# Patient Record
Sex: FEMALE | Race: WHITE
[De-identification: names, ages, dates, MRNs, and addresses within clinical notes are randomized per-mention and may not be internally consistent; named-entity substitution may affect disease eponyms.]

---

## 2019-10-24 ENCOUNTER — HOSPITAL ENCOUNTER (OUTPATIENT)
Dept: HOSPITAL 62 - RAD | Age: 34
End: 2019-10-24
Attending: ADVANCED PRACTICE MIDWIFE
Payer: OTHER GOVERNMENT

## 2019-10-24 DIAGNOSIS — O99.89: Primary | ICD-10-CM

## 2019-10-24 DIAGNOSIS — M54.5: ICD-10-CM

## 2019-10-24 DIAGNOSIS — Z3A.00: ICD-10-CM

## 2019-10-24 PROCEDURE — 76770 US EXAM ABDO BACK WALL COMP: CPT

## 2019-10-24 NOTE — RADIOLOGY REPORT (SQ)
EXAM DESCRIPTION:  U/S RETROPERITON (RENAL/AORTA)



COMPLETED DATE/TIME:  10/24/2019 4:50 pm



REASON FOR STUDY:  R SIDED CVA PAIN - PT IS PREGNANT M54.6  PAIN IN THORACIC SPINE



COMPARISON:  None.



TECHNIQUE:  Dynamic and static grayscale images acquired of the kidneys and bladder and recorded on P
ACS. Additional selected color Doppler and spectral images recorded.



LIMITATIONS:  None.



FINDINGS:  RIGHT KIDNEY:  The right kidney measures 13.0 cm in diameter, normal size.  Normal echogen
icity.  The right renal pelvis is dilated measures 2.5 cm in AP diameter.

LEFT KIDNEY:  The left kidney measures 12.1 cm in length, normal size. Normal echogenicity. No solid 
or suspicious masses. No hydronephrosis. No calcifications.

BLADDER: No masses.

OTHER FINDINGS:  The patient is pregnant.



IMPRESSION:  1.  The right renal pelvis is dilated. (the patient is pregnant.  The gestational age is
 not available at this reading).

2.  No hydronephrosis on the left.



TECHNICAL DOCUMENTATION:  JOB ID:  [id number]

 2011 [employer]- All Rights Reserved



Reading location - IP/workstation name: [email or web address]

## 2020-02-06 ENCOUNTER — HOSPITAL ENCOUNTER (OUTPATIENT)
Dept: HOSPITAL 62 - LC | Age: 35
Discharge: HOME | End: 2020-02-06
Attending: OBSTETRICS & GYNECOLOGY
Payer: OTHER GOVERNMENT

## 2020-02-06 DIAGNOSIS — Z3A.34: ICD-10-CM

## 2020-02-06 DIAGNOSIS — O47.03: ICD-10-CM

## 2020-02-06 DIAGNOSIS — O26.893: Primary | ICD-10-CM

## 2020-02-06 PROCEDURE — 59025 FETAL NON-STRESS TEST: CPT

## 2020-02-06 PROCEDURE — 4A1HXCZ MONITORING OF PRODUCTS OF CONCEPTION, CARDIAC RATE, EXTERNAL APPROACH: ICD-10-PCS | Performed by: OBSTETRICS & GYNECOLOGY

## 2020-02-28 ENCOUNTER — HOSPITAL ENCOUNTER (INPATIENT)
Dept: HOSPITAL 62 - LC | Age: 35
LOS: 3 days | Discharge: HOME | End: 2020-03-02
Attending: OBSTETRICS & GYNECOLOGY | Admitting: OBSTETRICS & GYNECOLOGY
Payer: OTHER GOVERNMENT

## 2020-02-28 DIAGNOSIS — O42.92: Primary | ICD-10-CM

## 2020-02-28 DIAGNOSIS — Z3A.37: ICD-10-CM

## 2020-02-28 DIAGNOSIS — F32.9: ICD-10-CM

## 2020-02-28 LAB
ADD MANUAL DIFF: NO
APPEARANCE UR: CLEAR
APTT PPP: (no result) S
BARBITURATES UR QL SCN: NEGATIVE
BASOPHILS # BLD AUTO: 0 10^3/UL (ref 0–0.2)
BASOPHILS NFR BLD AUTO: 0.3 % (ref 0–2)
BILIRUB UR QL STRIP: NEGATIVE
EOSINOPHIL # BLD AUTO: 0.1 10^3/UL (ref 0–0.6)
EOSINOPHIL NFR BLD AUTO: 0.6 % (ref 0–6)
ERYTHROCYTE [DISTWIDTH] IN BLOOD BY AUTOMATED COUNT: 13.3 % (ref 11.5–14)
GLUCOSE UR STRIP-MCNC: NEGATIVE MG/DL
HCT VFR BLD CALC: 33.7 % (ref 36–47)
HGB BLD-MCNC: 11.5 G/DL (ref 12–15.5)
KETONES UR STRIP-MCNC: NEGATIVE MG/DL
LYMPHOCYTES # BLD AUTO: 1.8 10^3/UL (ref 0.5–4.7)
LYMPHOCYTES NFR BLD AUTO: 13.2 % (ref 13–45)
MCH RBC QN AUTO: 29 PG (ref 27–33.4)
MCHC RBC AUTO-ENTMCNC: 34.3 G/DL (ref 32–36)
MCV RBC AUTO: 85 FL (ref 80–97)
METHADONE UR QL SCN: NEGATIVE
MONOCYTES # BLD AUTO: 1.1 10^3/UL (ref 0.1–1.4)
MONOCYTES NFR BLD AUTO: 8.5 % (ref 3–13)
NEUTROPHILS # BLD AUTO: 10.3 10^3/UL (ref 1.7–8.2)
NEUTS SEG NFR BLD AUTO: 77.4 % (ref 42–78)
NITRITE UR QL STRIP: NEGATIVE
PCP UR QL SCN: NEGATIVE
PH UR STRIP: 7 [PH] (ref 5–9)
PLATELET # BLD: 252 10^3/UL (ref 150–450)
PROT UR STRIP-MCNC: NEGATIVE MG/DL
RBC # BLD AUTO: 3.98 10^6/UL (ref 3.72–5.28)
SP GR UR STRIP: 1
TOTAL CELLS COUNTED % (AUTO): 100 %
URINE AMPHETAMINES SCREEN: NEGATIVE
URINE BENZODIAZEPINES SCREEN: NEGATIVE
URINE COCAINE SCREEN: NEGATIVE
URINE MARIJUANA (THC) SCREEN: NEGATIVE
UROBILINOGEN UR-MCNC: NEGATIVE MG/DL (ref ?–2)
WBC # BLD AUTO: 13.4 10^3/UL (ref 4–10.5)

## 2020-02-28 PROCEDURE — 86901 BLOOD TYPING SEROLOGIC RH(D): CPT

## 2020-02-28 PROCEDURE — 81005 URINALYSIS: CPT

## 2020-02-28 PROCEDURE — 86900 BLOOD TYPING SEROLOGIC ABO: CPT

## 2020-02-28 PROCEDURE — 94760 N-INVAS EAR/PLS OXIMETRY 1: CPT

## 2020-02-28 PROCEDURE — 36415 COLL VENOUS BLD VENIPUNCTURE: CPT

## 2020-02-28 PROCEDURE — 80307 DRUG TEST PRSMV CHEM ANLYZR: CPT

## 2020-02-28 PROCEDURE — 86850 RBC ANTIBODY SCREEN: CPT

## 2020-02-28 PROCEDURE — 86592 SYPHILIS TEST NON-TREP QUAL: CPT

## 2020-02-28 PROCEDURE — 85025 COMPLETE CBC W/AUTO DIFF WBC: CPT

## 2020-02-28 PROCEDURE — 85027 COMPLETE CBC AUTOMATED: CPT

## 2020-02-28 PROCEDURE — 84112 EVAL AMNIOTIC FLUID PROTEIN: CPT

## 2020-02-28 NOTE — ADMISSION PHYSICAL
=================================================================



=================================================================

Datetime Report Generated by CPN: 2020 18:10

   

   

=================================================================

CURRENT ADMISSION

=================================================================

   

Chief Complaint:  Suspected Ruptured Membranes

Chief Complaint Other:  "I think my water broke at 4:00"

Indication for Induction:  Not Applicable

Admit Impression :  Term, Intrauterine Pregnancy; Ruptured Membranes

Admit Plan:  Admit to Unit; Initiate Labor Augmentation Protocol

   

=================================================================

OBSTETRICAL HISTORY

=================================================================

   

EDC:  2020 00:00

   

=================================================================

PHYSICAL EXAM

=================================================================

   

General:  Normal

HEENT:  Normal

Neurologic:  Normal

Thyroid:  Normal

Heart:  Normal

Lungs:  Normal

Breast:  Normal

Back:  Normal

Abdomen:  Normal

Genitourinary Exam:  Normal

Extremities:  Normal

DTRs:  Normal

Pelvic Type:  Adequate

Vital Signs:  Reviewed; Within Normal Limits

   

=================================================================

VAGINAL EXAM

=================================================================

   

Dilatation:  1

Effacement:  50

Station:  -2

Contraction Comments:  irregular

   

=================================================================

MEMBRANES

=================================================================

   

Pooling:  Positive

Membranes:  Ruptured

Amniotic Fluid Color:  Clear

   

=================================================================

FETUS A

=================================================================

   

EGA:  37.3

Monitoring:  External US

FHR- Baseline:  140s

Variability:  Moderate 6-25bpm

Accelerations:  15X15

Decelerations:  None

FHR Category:  Category I

Admit Comment:  This  w/ an IUP at 37-3/7 weeks presents to L7D c/o

   "I think my water broke at 4:00 today".  Clear fluid noted. 

   ActimProm positive.  She is GBS Negative.  She has no

   co-morbidities.  Will start pitocin.

   

=================================================================

INFORMED CONSENT

=================================================================

   

Signature:  Electronically signed by Michell Rodriguez MD (EMIR) on

   2020 at 18:10  with User ID: [email or web address]

## 2020-02-29 PROCEDURE — 0KQM0ZZ REPAIR PERINEUM MUSCLE, OPEN APPROACH: ICD-10-PCS | Performed by: OBSTETRICS & GYNECOLOGY

## 2020-02-29 RX ADMIN — Medication SCH: at 10:39

## 2020-02-29 RX ADMIN — FERROUS SULFATE TAB 325 MG (65 MG ELEMENTAL FE) SCH: 325 (65 FE) TAB at 10:39

## 2020-02-29 RX ADMIN — IBUPROFEN SCH MG: 800 TABLET, FILM COATED ORAL at 15:05

## 2020-02-29 RX ADMIN — FERROUS SULFATE TAB 325 MG (65 MG ELEMENTAL FE) SCH MG: 325 (65 FE) TAB at 17:31

## 2020-02-29 RX ADMIN — DOCUSATE SODIUM SCH: 100 CAPSULE, LIQUID FILLED ORAL at 10:39

## 2020-02-29 RX ADMIN — IBUPROFEN SCH MG: 800 TABLET, FILM COATED ORAL at 21:22

## 2020-02-29 RX ADMIN — DOCUSATE SODIUM SCH MG: 100 CAPSULE, LIQUID FILLED ORAL at 17:31

## 2020-02-29 RX ADMIN — SENNOSIDES, DOCUSATE SODIUM SCH: 50; 8.6 TABLET, FILM COATED ORAL at 10:39

## 2020-03-01 LAB
ERYTHROCYTE [DISTWIDTH] IN BLOOD BY AUTOMATED COUNT: 13.5 % (ref 11.5–14)
HCT VFR BLD CALC: 33 % (ref 36–47)
HGB BLD-MCNC: 11.3 G/DL (ref 12–15.5)
MCH RBC QN AUTO: 29.1 PG (ref 27–33.4)
MCHC RBC AUTO-ENTMCNC: 34.1 G/DL (ref 32–36)
MCV RBC AUTO: 85 FL (ref 80–97)
PLATELET # BLD: 263 10^3/UL (ref 150–450)
RBC # BLD AUTO: 3.87 10^6/UL (ref 3.72–5.28)
WBC # BLD AUTO: 13.1 10^3/UL (ref 4–10.5)

## 2020-03-01 RX ADMIN — ESCITALOPRAM OXALATE SCH MG: 10 TABLET, FILM COATED ORAL at 21:31

## 2020-03-01 RX ADMIN — FERROUS SULFATE TAB 325 MG (65 MG ELEMENTAL FE) SCH MG: 325 (65 FE) TAB at 17:25

## 2020-03-01 RX ADMIN — DOCUSATE SODIUM SCH MG: 100 CAPSULE, LIQUID FILLED ORAL at 17:25

## 2020-03-01 RX ADMIN — IBUPROFEN SCH MG: 800 TABLET, FILM COATED ORAL at 21:30

## 2020-03-01 RX ADMIN — BUSPIRONE HYDROCHLORIDE SCH MG: 10 TABLET ORAL at 10:41

## 2020-03-01 RX ADMIN — FERROUS SULFATE TAB 325 MG (65 MG ELEMENTAL FE) SCH MG: 325 (65 FE) TAB at 09:12

## 2020-03-01 RX ADMIN — ESCITALOPRAM OXALATE SCH MG: 10 TABLET, FILM COATED ORAL at 10:41

## 2020-03-01 RX ADMIN — IBUPROFEN SCH MG: 800 TABLET, FILM COATED ORAL at 13:21

## 2020-03-01 RX ADMIN — IBUPROFEN SCH MG: 800 TABLET, FILM COATED ORAL at 05:40

## 2020-03-01 RX ADMIN — Medication SCH CAP: at 09:12

## 2020-03-01 RX ADMIN — SENNOSIDES, DOCUSATE SODIUM SCH EACH: 50; 8.6 TABLET, FILM COATED ORAL at 09:12

## 2020-03-01 RX ADMIN — DOCUSATE SODIUM SCH MG: 100 CAPSULE, LIQUID FILLED ORAL at 09:12

## 2020-03-01 RX ADMIN — BUSPIRONE HYDROCHLORIDE SCH MG: 10 TABLET ORAL at 21:29

## 2020-03-01 NOTE — PDOC PROGRESS REPORT
Subjective-OB


Progress Note for:: 03/01/20


Subjective: 


reports bleeding slowing, pain controlled with current meds. denies needs





Physical Exam (OB)


Vital Signs: 


                                        











Temp Pulse Resp BP Pulse Ox


 


 98.1 F   69   16   124/78   100 


 


 03/01/20 07:16  03/01/20 07:16  03/01/20 07:16  03/01/20 07:16  03/01/20 07:16








                                 Intake & Output











 02/29/20 03/01/20 03/02/20





 06:59 06:59 06:59


 


Intake Total  1040 


 


Balance  1040 


 


Weight 91.9 kg  














- Abdomen


Description: Soft


Hernia Present: No


Fundal Description: Firm, Midline


Fundal Height: u/u - u/2





- Abdominal


Distension: No distension


Tenderness: Nontender





- Extremities


Lower extremities: Bonnie's sign - neg


Calf: Normal, Nontender





Objective-Diagnostic


Laboratory: 


                                        





                                 03/01/20 06:52 





                                        











  03/01/20





  06:52


 


WBC  13.1 H


 


RBC  3.87


 


Hgb  11.3 L


 


Hct  33.0 L


 


MCV  85


 


MCH  29.1


 


MCHC  34.1


 


RDW  13.5


 


Plt Count  263














Assessment and Plan(PN)





- Assessment and Plan


(1) Precipitous delivery


Is this a current diagnosis for this admission?: Yes   





- Time Spent with Patient


Time with patient: Less than 15 minutes





- Disposition


Anticipated Discharge: Home


Within: within 24 hours

## 2020-03-02 VITALS — DIASTOLIC BLOOD PRESSURE: 72 MMHG | SYSTOLIC BLOOD PRESSURE: 122 MMHG

## 2020-03-02 RX ADMIN — DOCUSATE SODIUM SCH MG: 100 CAPSULE, LIQUID FILLED ORAL at 09:20

## 2020-03-02 RX ADMIN — BUSPIRONE HYDROCHLORIDE SCH MG: 10 TABLET ORAL at 09:20

## 2020-03-02 RX ADMIN — SENNOSIDES, DOCUSATE SODIUM SCH EACH: 50; 8.6 TABLET, FILM COATED ORAL at 09:20

## 2020-03-02 RX ADMIN — IBUPROFEN SCH MG: 800 TABLET, FILM COATED ORAL at 14:00

## 2020-03-02 RX ADMIN — IBUPROFEN SCH MG: 800 TABLET, FILM COATED ORAL at 05:20

## 2020-03-02 RX ADMIN — FERROUS SULFATE TAB 325 MG (65 MG ELEMENTAL FE) SCH MG: 325 (65 FE) TAB at 09:20

## 2020-03-02 RX ADMIN — Medication SCH CAP: at 09:20

## 2020-03-02 NOTE — PDOC PROGRESS REPORT
Subjective-OB


Progress Note for:: 03/02/20


Subjective: 





Doing well, breastfeeding, lactation consultant in room, voiding, eating well, 

scant lochia, taking lexapro and buspar





Physical Exam (OB)


Vital Signs: 


                                        











Temp Pulse Resp BP Pulse Ox


 


 98.8 F   97   18   130/77 H  97 


 


 03/02/20 08:31  03/02/20 08:31  03/02/20 08:31  03/02/20 08:31  03/02/20 08:31








                                 Intake & Output











 03/01/20 03/02/20 03/03/20





 06:59 06:59 06:59


 


Intake Total 1040 1330 


 


Balance 1040 1330 














- PIH/Pre-Eclampsia


DTR's: 1 +


Clonus: Negative


Headache: Absent


Epigastric Pain: No


Visual Changes: No





- Lochia


Lochia Amount: Scant < 10 ml


Lochia Color: Rubra/Red





- Abdomen


Description: Soft


Hernia Present: No


Fundal Description: Firm, Midline


Fundal Height: u/u - u/2





Objective-Diagnostic


Laboratory: 


                                        





                                 03/01/20 06:52 











Assessment and Plan(PN)





- Assessment and Plan


(1) Depression


Qualifiers: 


   Depression Type: unspecified   Qualified Code(s): F32.9 - Major depressive 

disorder, single episode, unspecified   


Is this a current diagnosis for this admission?: Yes   





(2) Precipitous delivery


Is this a current diagnosis for this admission?: Yes   





- Time Spent with Patient


Time with patient: Less than 15 minutes





- Disposition


Anticipated Discharge: Home


Within: within 24 hours - baby home with patient

## 2020-03-02 NOTE — PDOC DISCHARGE SUMMARY
Impression





- Admit/DC Date/PCP


Admission Date/Primary Care Provider: 


  02/28/20 18:54





  





Discharge Date: 03/02/20





- Discharge Diagnosis


(1) Depression


Is this a current diagnosis for this admission?: Yes   





(2) Precipitous delivery


Is this a current diagnosis for this admission?: Yes   





- Additional Information


Resuscitation Status: Full Code


Discharge Diet: As Tolerated, Regular


Discharge Activity: Activity As Tolerated, Pelvic Rest


Prescriptions: 


Escitalopram Oxalate [Lexapro 10 mg Tablet] 20 mg PO QHS #60


Buspirone HCl [Buspar 10 mg Tablet] 7.5 mg PO BID #60


Home Medications: 








Buspirone HCl [Buspar 10 mg Tablet] 7.5 mg PO BID #60 03/02/20 


Escitalopram Oxalate [Lexapro 10 mg Tablet] 20 mg PO QHS #60 03/02/20 











HPI


Gestational Age: 37.4


Reason(s) for Admission: Onset of Labor, PROM


Prenatal Procedures: Ultrasound


Intrapartum Procedure(s): Spontaneous Vaginal Delivery


Postpartum Complication(s): Laceration-Vaginal


Laceration-Degree: 2nd - male, wt 6-9





Hospital Course


Hospital Course: 


routine





Results


Laboratory Results: 


                                        











WBC  13.1 10^3/uL (4.0-10.5)  H  03/01/20  06:52    


 


RBC  3.87 10^6/uL (3.72-5.28)   03/01/20  06:52    


 


Hgb  11.3 g/dL (12.0-15.5)  L  03/01/20  06:52    


 


Hct  33.0 % (36.0-47.0)  L  03/01/20  06:52    


 


MCV  85 fl (80-97)   03/01/20  06:52    


 


MCH  29.1 pg (27.0-33.4)   03/01/20  06:52    


 


MCHC  34.1 g/dL (32.0-36.0)   03/01/20  06:52    


 


RDW  13.5 % (11.5-14.0)   03/01/20  06:52    


 


Plt Count  263 10^3/uL (150-450)   03/01/20  06:52    


 


Lymph % (Auto)  13.2 % (13-45)   02/28/20  18:47    


 


Mono % (Auto)  8.5 % (3-13)   02/28/20  18:47    


 


Eos % (Auto)  0.6 % (0-6)   02/28/20  18:47    


 


Baso % (Auto)  0.3 % (0-2)   02/28/20  18:47    


 


Absolute Neuts (auto)  10.3 10^3/uL (1.7-8.2)  H  02/28/20  18:47    


 


Absolute Lymphs (auto)  1.8 10^3/uL (0.5-4.7)   02/28/20  18:47    


 


Absolute Monos (auto)  1.1 10^3/uL (0.1-1.4)   02/28/20  18:47    


 


Absolute Eos (auto)  0.1 10^3/uL (0.0-0.6)   02/28/20  18:47    


 


Absolute Basos (auto)  0.0 10^3/uL (0.0-0.2)   02/28/20  18:47    


 


Seg Neutrophils %  77.4 % (42-78)   02/28/20  18:47    


 


Urine Color  STRAW   02/28/20  17:30    


 


Urine Appearance  CLEAR   02/28/20  17:30    


 


Urine pH  7.0  (5.0-9.0)   02/28/20  17:30    


 


Ur Specific Gravity  1.005   02/28/20  17:30    


 


Urine Protein  NEGATIVE mg/dL (NEGATIVE)   02/28/20  17:30    


 


Urine Glucose (UA)  NEGATIVE mg/dL (NEGATIVE)   02/28/20  17:30    


 


Urine Ketones  NEGATIVE mg/dL (NEGATIVE)   02/28/20  17:30    


 


Urine Blood  NEGATIVE  (NEGATIVE)   02/28/20  17:30    


 


Urine Nitrite  NEGATIVE  (NEGATIVE)   02/28/20  17:30    


 


Urine Bilirubin  NEGATIVE  (NEGATIVE)   02/28/20  17:30    


 


Urine Urobilinogen  NEGATIVE mg/dL (<2.0)   02/28/20  17:30    


 


Ur Leukocyte Esterase  NEGATIVE  (NEGATIVE)   02/28/20  17:30    


 


Urine Ascorbic Acid  NEGATIVE  (NEGATIVE)   02/28/20  17:30    


 


Fetal Membranes Rupture  POSITIVE  (NEGATIVE)  H  02/28/20  17:41    


 


Urine Opiates Screen  NEGATIVE   02/28/20  17:30    


 


Urine Methadone Screen  NEGATIVE   02/28/20  17:30    


 


Ur Barbiturates Screen  NEGATIVE   02/28/20  17:30    


 


Ur Phencyclidine Scrn  NEGATIVE   02/28/20  17:30    


 


Ur Amphetamines Screen  NEGATIVE   02/28/20  17:30    


 


U Benzodiazepines Scrn  NEGATIVE   02/28/20  17:30    


 


Urine Cocaine Screen  NEGATIVE   02/28/20  17:30    


 


U Marijuana (THC) Screen  NEGATIVE   02/28/20  17:30    


 


RPR  NONREACTIVE  (NONREACTIVE)   02/28/20  18:47    


 


Blood Type  B POSITIVE   02/28/20  18:47    


 


Antibody Screen  NEGATIVE   02/28/20  18:47    














Plan


Health Concerns: 


depression


Plan of Treatment: 


rev S&S to report, Depression meds


Goals: 


no complications


Time Spent: Less than 30 Minutes

## 2020-03-04 NOTE — DELIVERY SUMMARY
=================================================================

Del Sum A-C

=================================================================

Datetime Report Generated by CPN: 2020 09:11

   

   

=================================================================

DELIVERY PERSONNEL

=================================================================

   

DELIVERY PERSONNEL:  [id number]

Labor and Delivery Nurse::  Leonor Medina RN

Labor and Delivery Nurse::  Rico Brandon RN

Scr Tech/CNA:  Aliciasasha Rand, CST

Additional Personnel: :  Parker Ortiz RN

   

=================================================================

MATERNAL INFORMATION

=================================================================

   

Delivery Anesthesia:  Epidural

Medications After Delivery:  Pitocin Drip 20 Units/1000ml NSS; Cytotec

   1000mcg Per Rectum/Vagina

Meds After Delivery Comment:  Pitocin 20 units in NS bolus

Delivery QBL:  300

Delivery QBL Comment:  300

Maternal Complications:  Precipitous Labor (<3hrs)

Provider Comments:   of a viable male  @0718 w/ an GEMA

   presentation; APGARS 8, 9; 2nd deg midline vag lac

   

=================================================================

LABOR SUMMARY

=================================================================

   

EDC:  2020 00:00

No. Babies in Womb:  1

 Attempted:  No

Labor Anesthesia:  Epidural

   

=================================================================

LABOR INFORMATION

=================================================================

   

Reason for Induction:  Not Applicable

Onset of Labor:  2020 06:30

Complete Dilatation:  2020 07:15

Oxytocin:  Augmentation

Group B Beta Strep:  Negative

Steroids Given:  None

Reason Steroids Not Administered:  Not Applicable

   

=================================================================

MEMBRANES

=================================================================

   

Membranes Rupture Method:  Spontaneous

Rupture of Membranes:  2020 16:00

Length of Rupture (hr):  15.30

Amniotic Fluid Color:  Light Meconium

Amniotic Fluid Amount:  Small

Amniotic Fluid Odor:  Normal

   

=================================================================

STAGES OF LABOR

=================================================================

   

Stage 1 hr:  0

Stage 1 min:  45

Stage 2 hr:  0

Stage 2 min:  3

Stage 3 hr:  0

Stage 3 min:  2

Total Time in Labor hr:  0

Total Time in Labor min:  50

   

=================================================================

VAGINAL DELIVERY

=================================================================

   

Episiotomy:  None

Laceration #1:  Vaginal

Laceration Extension #1:  Second Degree

Other Laceration:  Midline

Laceration Repair:  Yes

Laceration Repair Note:  2nd deg midline vag lac repaired w/2-0 Vicryl

Sponge Count Correct:  Yes

Sharps Count Correct:  Yes

   

=================================================================

CSECTION DELIVERY

=================================================================

   

Primary Indication:  N/A

Secondary Indication:  N/A

CSection Incidence:  N/A

Labor:  N/A

Elective:  N/A

CSection Incision:  N/A

   

=================================================================

BABY A INFORMATION

=================================================================

   

Infant Delivery Date/Time:  2020 07:18

Method of Delivery:  Vaginal

Method of Delivery:  Vaginal

Nurse Controlled Delivery:  Yes

Born in Route :  No

:  N/A

Forceps:  N/A

Vacuum Extraction:  N/A

Shoulder Dystocia :  No

   

=================================================================

PRESENTATION/POSITION BABY A

=================================================================

   

Presentation:  Cephalic

Cephalic Presentation:  Vertex

Vertex Position:  Left Occipital Anterior

Breech Presentation:  N/A

   

=================================================================

PLACENTA INFORMATION BABY A

=================================================================

   

Placenta Delivery Time :  2020 07:20

Placenta Method of Delivery:  Spontaneous

Placenta Status:  Delivered

   

=================================================================

APGAR SCORES BABY A

=================================================================

   

Heart Rate 1 min:  >100 bpm

Resp Effort 1 min:  Good Cry

Reflex Irritability 1 min:  Cough or Sneeze or Pulls Away

Muscle Tone 1 min:  Active Motion

Color 1 min:  Blue/Pale

Resuscitation Effort 1 min:  Tactile Stimulation

APGAR SCORE 1 MIN:  8

Heart Rate 5 min:  >100 bpm

Resp Effort 5 min:  Good Cry

Reflex Irritability 5 min:  Cough or Sneeze or Pulls Away

Muscle Tone 5 min:  Active Motion

Color 5 min:  Body Pink, Extremities Blue

Resuscitation Effort 5 min:  N/A

APGAR SCORE 5 MIN:  9

   

=================================================================

INFANT INFORMATION BABY A

=================================================================

   

Gestational Age at Delivery:  37.4

Gestational Status:  Early Term- 37- 38.6 Weeks

Infant Outcome :  Liveborn

Infant Condition :  Stable

Infant Sex:  Male

Infant Sex:  Male

   

=================================================================

IDENTIFICATION BABY A

=================================================================

   

Infant Verification Date/Time:  2020 07:47

ID Band Number:  [id number]

Mother's Name Verified:  Yes

Infant Medical Record Number:  [id number]

RN Verifying Infant:  SEVERO Medina RN, DANA Ortiz RN

   

=================================================================

WEIGHT/LENGTH BABY A

=================================================================

   

Infant Birthweight (gm):  2981

Infant Weight (lb):  6

Infant Weight (oz):  9

Infant Length (in):  18.25

Infant Length (cm):  46.36

   

=================================================================

CORD INFORMATION BABY A

=================================================================

   

No. Cord Vessels:  2

Nuchal Cord :  N/A

Cord Blood Taken:  Yes-For Storage (Mom's Blood type +)

Infant Suction:  None

   

=================================================================

ASSESSMENT BABY A

=================================================================

   

Infant Complications:  None

Skin to Skin:  Yes

Infant Care By:  TMartin,RN

Transferred To:  Remains with Mother

   

=================================================================

BABY B INFORMATION

=================================================================

   

 :  N/A

   

=================================================================

SIGNATURES

=================================================================

   

Signature:  Electronically signed by Michell Younger Eure, MD ([email or web address]) on

   2020 at 07:44  with User ID: [email or web address]